# Patient Record
Sex: FEMALE | Race: OTHER | HISPANIC OR LATINO | URBAN - METROPOLITAN AREA
[De-identification: names, ages, dates, MRNs, and addresses within clinical notes are randomized per-mention and may not be internally consistent; named-entity substitution may affect disease eponyms.]

---

## 2021-09-04 ENCOUNTER — APPOINTMENT (OUTPATIENT)
Dept: RADIOLOGY | Facility: MEDICAL CENTER | Age: 61
End: 2021-09-04
Attending: EMERGENCY MEDICINE
Payer: MEDICARE

## 2021-09-04 ENCOUNTER — HOSPITAL ENCOUNTER (OUTPATIENT)
Facility: MEDICAL CENTER | Age: 61
End: 2021-09-04
Attending: EMERGENCY MEDICINE | Admitting: INTERNAL MEDICINE
Payer: MEDICARE

## 2021-09-04 VITALS
SYSTOLIC BLOOD PRESSURE: 179 MMHG | OXYGEN SATURATION: 94 % | HEIGHT: 63 IN | TEMPERATURE: 98.7 F | RESPIRATION RATE: 18 BRPM | WEIGHT: 165 LBS | HEART RATE: 71 BPM | DIASTOLIC BLOOD PRESSURE: 84 MMHG | BODY MASS INDEX: 29.23 KG/M2

## 2021-09-04 DIAGNOSIS — S01.81XA FACIAL LACERATION, INITIAL ENCOUNTER: ICD-10-CM

## 2021-09-04 DIAGNOSIS — S02.401B OPEN FRACTURE OF MAXILLARY SINUS, INITIAL ENCOUNTER (HCC): ICD-10-CM

## 2021-09-04 DIAGNOSIS — R55 SYNCOPE, UNSPECIFIED SYNCOPE TYPE: ICD-10-CM

## 2021-09-04 LAB
ALBUMIN SERPL BCP-MCNC: 3.3 G/DL (ref 3.2–4.9)
ALBUMIN/GLOB SERPL: 1.2 G/DL
ALP SERPL-CCNC: 108 U/L (ref 30–99)
ALT SERPL-CCNC: 31 U/L (ref 2–50)
ANION GAP SERPL CALC-SCNC: 11 MMOL/L (ref 7–16)
APPEARANCE UR: CLEAR
AST SERPL-CCNC: 19 U/L (ref 12–45)
BACTERIA #/AREA URNS HPF: NEGATIVE /HPF
BASOPHILS # BLD AUTO: 0.6 % (ref 0–1.8)
BASOPHILS # BLD: 0.04 K/UL (ref 0–0.12)
BILIRUB SERPL-MCNC: 0.2 MG/DL (ref 0.1–1.5)
BILIRUB UR QL STRIP.AUTO: NEGATIVE
BUN SERPL-MCNC: 44 MG/DL (ref 8–22)
CALCIUM SERPL-MCNC: 7.9 MG/DL (ref 8.5–10.5)
CHLORIDE SERPL-SCNC: 105 MMOL/L (ref 96–112)
CO2 SERPL-SCNC: 20 MMOL/L (ref 20–33)
COLOR UR: YELLOW
CREAT SERPL-MCNC: 1.34 MG/DL (ref 0.5–1.4)
EOSINOPHIL # BLD AUTO: 0.05 K/UL (ref 0–0.51)
EOSINOPHIL NFR BLD: 0.7 % (ref 0–6.9)
EPI CELLS #/AREA URNS HPF: NEGATIVE /HPF
ERYTHROCYTE [DISTWIDTH] IN BLOOD BY AUTOMATED COUNT: 47.9 FL (ref 35.9–50)
GLOBULIN SER CALC-MCNC: 2.7 G/DL (ref 1.9–3.5)
GLUCOSE SERPL-MCNC: 286 MG/DL (ref 65–99)
GLUCOSE UR STRIP.AUTO-MCNC: 500 MG/DL
HCT VFR BLD AUTO: 29.9 % (ref 37–47)
HGB BLD-MCNC: 9.3 G/DL (ref 12–16)
HYALINE CASTS #/AREA URNS LPF: ABNORMAL /LPF
IMM GRANULOCYTES # BLD AUTO: 0.03 K/UL (ref 0–0.11)
IMM GRANULOCYTES NFR BLD AUTO: 0.4 % (ref 0–0.9)
KETONES UR STRIP.AUTO-MCNC: NEGATIVE MG/DL
LEUKOCYTE ESTERASE UR QL STRIP.AUTO: ABNORMAL
LYMPHOCYTES # BLD AUTO: 2.02 K/UL (ref 1–4.8)
LYMPHOCYTES NFR BLD: 28.4 % (ref 22–41)
MCH RBC QN AUTO: 28.4 PG (ref 27–33)
MCHC RBC AUTO-ENTMCNC: 31.1 G/DL (ref 33.6–35)
MCV RBC AUTO: 91.4 FL (ref 81.4–97.8)
MICRO URNS: ABNORMAL
MONOCYTES # BLD AUTO: 0.38 K/UL (ref 0–0.85)
MONOCYTES NFR BLD AUTO: 5.3 % (ref 0–13.4)
NEUTROPHILS # BLD AUTO: 4.59 K/UL (ref 2–7.15)
NEUTROPHILS NFR BLD: 64.6 % (ref 44–72)
NITRITE UR QL STRIP.AUTO: NEGATIVE
NRBC # BLD AUTO: 0 K/UL
NRBC BLD-RTO: 0 /100 WBC
PH UR STRIP.AUTO: 6 [PH] (ref 5–8)
PLATELET # BLD AUTO: 305 K/UL (ref 164–446)
PMV BLD AUTO: 9.5 FL (ref 9–12.9)
POTASSIUM SERPL-SCNC: 3.5 MMOL/L (ref 3.6–5.5)
PROT SERPL-MCNC: 6 G/DL (ref 6–8.2)
PROT UR QL STRIP: NEGATIVE MG/DL
RBC # BLD AUTO: 3.27 M/UL (ref 4.2–5.4)
RBC # URNS HPF: ABNORMAL /HPF
RBC UR QL AUTO: NEGATIVE
SODIUM SERPL-SCNC: 136 MMOL/L (ref 135–145)
SP GR UR STRIP.AUTO: 1.01
TROPONIN T SERPL-MCNC: 18 NG/L (ref 6–19)
UROBILINOGEN UR STRIP.AUTO-MCNC: 0.2 MG/DL
WBC # BLD AUTO: 7.1 K/UL (ref 4.8–10.8)
WBC #/AREA URNS HPF: ABNORMAL /HPF

## 2021-09-04 PROCEDURE — 90715 TDAP VACCINE 7 YRS/> IM: CPT | Performed by: EMERGENCY MEDICINE

## 2021-09-04 PROCEDURE — 84484 ASSAY OF TROPONIN QUANT: CPT

## 2021-09-04 PROCEDURE — 304217 HCHG IRRIGATION SYSTEM

## 2021-09-04 PROCEDURE — 85025 COMPLETE CBC W/AUTO DIFF WBC: CPT

## 2021-09-04 PROCEDURE — 700101 HCHG RX REV CODE 250: Performed by: EMERGENCY MEDICINE

## 2021-09-04 PROCEDURE — 73140 X-RAY EXAM OF FINGER(S): CPT | Mod: LT

## 2021-09-04 PROCEDURE — 93005 ELECTROCARDIOGRAM TRACING: CPT | Performed by: EMERGENCY MEDICINE

## 2021-09-04 PROCEDURE — 99285 EMERGENCY DEPT VISIT HI MDM: CPT

## 2021-09-04 PROCEDURE — 72125 CT NECK SPINE W/O DYE: CPT | Mod: ME

## 2021-09-04 PROCEDURE — 700102 HCHG RX REV CODE 250 W/ 637 OVERRIDE(OP): Performed by: EMERGENCY MEDICINE

## 2021-09-04 PROCEDURE — 304999 HCHG REPAIR-SIMPLE/INTERMED LEVEL 1

## 2021-09-04 PROCEDURE — A9270 NON-COVERED ITEM OR SERVICE: HCPCS | Performed by: EMERGENCY MEDICINE

## 2021-09-04 PROCEDURE — 700111 HCHG RX REV CODE 636 W/ 250 OVERRIDE (IP): Performed by: EMERGENCY MEDICINE

## 2021-09-04 PROCEDURE — 71045 X-RAY EXAM CHEST 1 VIEW: CPT

## 2021-09-04 PROCEDURE — 303747 HCHG EXTRA SUTURE

## 2021-09-04 PROCEDURE — 70450 CT HEAD/BRAIN W/O DYE: CPT | Mod: ME

## 2021-09-04 PROCEDURE — 99284 EMERGENCY DEPT VISIT MOD MDM: CPT | Performed by: INTERNAL MEDICINE

## 2021-09-04 PROCEDURE — 90471 IMMUNIZATION ADMIN: CPT

## 2021-09-04 PROCEDURE — 80053 COMPREHEN METABOLIC PANEL: CPT

## 2021-09-04 PROCEDURE — 70486 CT MAXILLOFACIAL W/O DYE: CPT | Mod: MG

## 2021-09-04 PROCEDURE — 700105 HCHG RX REV CODE 258: Performed by: EMERGENCY MEDICINE

## 2021-09-04 PROCEDURE — 73140 X-RAY EXAM OF FINGER(S): CPT | Mod: RT

## 2021-09-04 PROCEDURE — 81001 URINALYSIS AUTO W/SCOPE: CPT

## 2021-09-04 PROCEDURE — G0378 HOSPITAL OBSERVATION PER HR: HCPCS

## 2021-09-04 RX ORDER — SODIUM CHLORIDE 9 MG/ML
1000 INJECTION, SOLUTION INTRAVENOUS ONCE
Status: COMPLETED | OUTPATIENT
Start: 2021-09-04 | End: 2021-09-04

## 2021-09-04 RX ORDER — IBUPROFEN 600 MG/1
600 TABLET ORAL ONCE
Status: COMPLETED | OUTPATIENT
Start: 2021-09-04 | End: 2021-09-04

## 2021-09-04 RX ORDER — METFORMIN HYDROCHLORIDE 500 MG/1
1000 TABLET, EXTENDED RELEASE ORAL 2 TIMES DAILY
COMMUNITY

## 2021-09-04 RX ORDER — AMOXICILLIN AND CLAVULANATE POTASSIUM 875; 125 MG/1; MG/1
1 TABLET, FILM COATED ORAL ONCE
Status: COMPLETED | OUTPATIENT
Start: 2021-09-04 | End: 2021-09-04

## 2021-09-04 RX ORDER — ACARBOSE 50 MG/1
50 TABLET ORAL
COMMUNITY

## 2021-09-04 RX ORDER — LIDOCAINE HYDROCHLORIDE 20 MG/ML
20 INJECTION, SOLUTION INFILTRATION; PERINEURAL ONCE
Status: COMPLETED | OUTPATIENT
Start: 2021-09-04 | End: 2021-09-04

## 2021-09-04 RX ADMIN — CLOSTRIDIUM TETANI TOXOID ANTIGEN (FORMALDEHYDE INACTIVATED), CORYNEBACTERIUM DIPHTHERIAE TOXOID ANTIGEN (FORMALDEHYDE INACTIVATED), BORDETELLA PERTUSSIS TOXOID ANTIGEN (GLUTARALDEHYDE INACTIVATED), BORDETELLA PERTUSSIS FILAMENTOUS HEMAGGLUTININ ANTIGEN (FORMALDEHYDE INACTIVATED), BORDETELLA PERTUSSIS PERTACTIN ANTIGEN, AND BORDETELLA PERTUSSIS FIMBRIAE 2/3 ANTIGEN 0.5 ML: 5; 2; 2.5; 5; 3; 5 INJECTION, SUSPENSION INTRAMUSCULAR at 14:13

## 2021-09-04 RX ADMIN — IBUPROFEN 600 MG: 600 TABLET ORAL at 17:05

## 2021-09-04 RX ADMIN — AMOXICILLIN AND CLAVULANATE POTASSIUM 1 TABLET: 875; 125 TABLET, FILM COATED ORAL at 16:13

## 2021-09-04 RX ADMIN — SODIUM CHLORIDE 1000 ML: 9 INJECTION, SOLUTION INTRAVENOUS at 15:44

## 2021-09-04 RX ADMIN — LIDOCAINE HYDROCHLORIDE 20 ML: 20 INJECTION, SOLUTION INFILTRATION; PERINEURAL at 14:15

## 2021-09-04 ASSESSMENT — ENCOUNTER SYMPTOMS
FEVER: 0
CHILLS: 0
VOMITING: 0
CONSTIPATION: 0
ABDOMINAL PAIN: 0
BACK PAIN: 0
HEADACHES: 0
NAUSEA: 0
SHORTNESS OF BREATH: 0
PALPITATIONS: 0
DIZZINESS: 0
COUGH: 0
DIARRHEA: 0

## 2021-09-04 NOTE — ED PROVIDER NOTES
ED Provider Note    Scribed for Fredrick Boyd M.D. by Abbie Jennings. 9/4/2021, 1:53 PM.    Primary care provider: No primary care provider noted.   Means of arrival: EMS  History obtained from: Patient  History limited by: None    CHIEF COMPLAINT  Chief Complaint   Patient presents with   • Fall   • Syncope   • Lip Laceration   • Digit Pain     bilateral index finger   • Neck Pain   • Jaw Pain     PPE Note: I personally donned full PPE for all patient encounters during this visit, including wearing an N95 respirator mask and eye protection. Scribe remained outside the patient's room and did not have any contact with the patient for the duration of patient encounter.      EMI Shirley is a 61 y.o. female who presents to the Emergency Department with neck pain after sustaining a ground level fall prior to arrival. She remembers feeling dizzy but does not remember the fall. She believes she hit her head during the fall because she is experiencing neck pain as well as jaw pain. She is additionally complaining of index finger pain bilaterally. No alleviating or exacerbating factors identified. She denies taking any blood thinners. She denies chest pain or shortness of breath before or after the fall. She has a history of diabetes but denies any other medical problems. Her tetanus vaccine is not up to date.     REVIEW OF SYSTEMS  Pertinent positives include neck pain, dizziness, fall, jaw pain, index finger pain. Pertinent negatives include no shortness of breath and chest pain.  All other systems reviewed and negative.    PAST MEDICAL HISTORY   has a past medical history of Diabetes (HCC).    SURGICAL HISTORY  patient denies any surgical history    SOCIAL HISTORY  Social History     Tobacco Use   • Smoking status: None noted   Substance Use Topics   • Alcohol use: None noted   • Drug use: None noted       FAMILY HISTORY  No family history noted.    CURRENT MEDICATIONS  No current outpatient  "medications    ALLERGIES  No Known Allergies    PHYSICAL EXAM  VITAL SIGNS: /78   Pulse 80   Temp 37.1 °C (98.7 °F) (Temporal)   Resp 20   Ht 1.6 m (5' 3\")   Wt 74.8 kg (165 lb)   SpO2 92%   BMI 29.23 kg/m²     Constitutional: Well developed, Well nourished, No acute distress, Non-toxic appearance. Patient in C-collar.  HENT: Through and through 2 cm laceration to the left side of her mentum. Chipped lateral incisor, TMs normal, mucous membranes moist, midface stable  Eyes: nonicteric  Neck: vague neck tenderness but no tenderness to palpation over the spinal processes  Lymphatic: No lymphadenopathy noted.   Cardiovascular: Regular rate and rhythm, no gallops rubs or murmurs  Lungs: Clear bilaterally   Abdomen: NTTP, pelvis stable   Skin: Warm, Dry, no rash  Back: No TLS midline TTP  Rectal: Hemoccult negative.  Extremities: full ROM without TTP  Neurologic: Alert, appropriate, follows commands, moving all extremities, normal speech   Psychiatric: Affect normal    DIAGNOSTIC STUDIES / PROCEDURES    LABS  Results for orders placed or performed during the hospital encounter of 09/04/21   CBC with Differential   Result Value Ref Range    WBC 7.1 4.8 - 10.8 K/uL    RBC 3.27 (L) 4.20 - 5.40 M/uL    Hemoglobin 9.3 (L) 12.0 - 16.0 g/dL    Hematocrit 29.9 (L) 37.0 - 47.0 %    MCV 91.4 81.4 - 97.8 fL    MCH 28.4 27.0 - 33.0 pg    MCHC 31.1 (L) 33.6 - 35.0 g/dL    RDW 47.9 35.9 - 50.0 fL    Platelet Count 305 164 - 446 K/uL    MPV 9.5 9.0 - 12.9 fL    Neutrophils-Polys 64.60 44.00 - 72.00 %    Lymphocytes 28.40 22.00 - 41.00 %    Monocytes 5.30 0.00 - 13.40 %    Eosinophils 0.70 0.00 - 6.90 %    Basophils 0.60 0.00 - 1.80 %    Immature Granulocytes 0.40 0.00 - 0.90 %    Nucleated RBC 0.00 /100 WBC    Neutrophils (Absolute) 4.59 2.00 - 7.15 K/uL    Lymphs (Absolute) 2.02 1.00 - 4.80 K/uL    Monos (Absolute) 0.38 0.00 - 0.85 K/uL    Eos (Absolute) 0.05 0.00 - 0.51 K/uL    Baso (Absolute) 0.04 0.00 - 0.12 K/uL    " Immature Granulocytes (abs) 0.03 0.00 - 0.11 K/uL    NRBC (Absolute) 0.00 K/uL   Complete Metabolic Panel (CMP)   Result Value Ref Range    Sodium 136 135 - 145 mmol/L    Potassium 3.5 (L) 3.6 - 5.5 mmol/L    Chloride 105 96 - 112 mmol/L    Co2 20 20 - 33 mmol/L    Anion Gap 11.0 7.0 - 16.0    Glucose 286 (H) 65 - 99 mg/dL    Bun 44 (H) 8 - 22 mg/dL    Creatinine 1.34 0.50 - 1.40 mg/dL    Calcium 7.9 (L) 8.5 - 10.5 mg/dL    AST(SGOT) 19 12 - 45 U/L    ALT(SGPT) 31 2 - 50 U/L    Alkaline Phosphatase 108 (H) 30 - 99 U/L    Total Bilirubin 0.2 0.1 - 1.5 mg/dL    Albumin 3.3 3.2 - 4.9 g/dL    Total Protein 6.0 6.0 - 8.2 g/dL    Globulin 2.7 1.9 - 3.5 g/dL    A-G Ratio 1.2 g/dL   Troponin   Result Value Ref Range    Troponin T 18 6 - 19 ng/L   URINALYSIS (UA)    Specimen: Urine   Result Value Ref Range    Micro Urine Req Microscopic    ESTIMATED GFR   Result Value Ref Range    GFR If  49 (A) >60 mL/min/1.73 m 2    GFR If Non African American 40 (A) >60 mL/min/1.73 m 2       All labs reviewed by me.    EKG  Obtained at 1:23 PM   Normal Sinus rhythm   Rate 78  Axis normal   Intervals normal   No ST segment elevation or depression   No T wave inversion  No Ectopy    RADIOLOGY  DX-FINGER(S) 2+ LEFT   Final Result      No evidence of fracture or dislocation.      DX-FINGER(S) 2+ RIGHT   Final Result      No evidence of fracture or dislocation.      CT-HEAD W/O   Final Result         1. No acute intracranial abnormality. No evidence of acute intracranial hemorrhage or mass lesion.               CT-CSPINE WITHOUT PLUS RECONS   Final Result      No acute fracture or dislocation seen in the CT scan of the cervical spine.      CT-MAXILLOFACIAL W/O PLUS RECONS   Final Result         1.  Minimally displaced fracture of the lateral wall of the left maxillary sinus.      2.  Small amount of fluid noted in the left maxillary sinus.      3.  No other facial fractures are identified.      DX-CHEST-PORTABLE (1 VIEW)    Final Result         No acute cardiac or pulmonary abnormality is identified.        The radiologist's interpretation of all radiological studies have been reviewed by me.    COURSE & MEDICAL DECISION MAKING  Nursing notes, VS, PMSFHx reviewed in chart.     1:53 PM - Patient seen and examined at bedside. Ordered for DX- Fingers Right and Left, CT-Maxillofacial, CT-CSpine, CT-Head, DX-Chest, Urinalysis, CB with diff, CMP, Troponin, Estimated GFR, and EKG to evaluate. Patient was treated with Adacel 0.5 ml injection and Xylocaine 2% prior to procedure for her symptoms.     2:15 PM - Patient's hematocrit was 29.9. She is not low risk based on [place] Syncope Rules.     2:38 PM - Treated patient with NS infusion 1000 ml.    2:39 PM - Performed rectal exam as noted in physical exam above.    3:24 PM - Patient was reevaluated at bedside. Discussed radiology results with the patient and informed them about the plan for admission after the laceration repair because of her syncope risk level. Patient verbalizes understanding and agreement to this plan of care. Performed laceration repair as noted below at this time.     Laceration Repair Procedure Note    Indication: Laceration    Procedure: The patient was placed in the appropriate position and anesthesia around the laceration was obtained by infiltration using 4.0 cc of 2% Lidocaine without epinephrine. The area was then cleansed with betadine and draped in a sterile fashion. The wound was explored and no foreign body was found. The laceration was closed in three layers. The deep layer was closed with 5-0 Vicryl using 3 interrupted sutures .  The subcutaneous layer was closed with 5-0 Vicryl using 1 interrupted suture and the skin was closed with 5-0 Ethilon using 3 interrupted sutures. There were no additional lacerations requiring repair. The wound area was then dressed with a sterile dressing.      Total repaired wound length: 2 cm.     Other Items: Suture  count: 7    The patient tolerated the procedure well.    Complications: None    3:44 PM - Paged Hospitalist.     Decision Making:  This is a 61 y.o. year old female who presents with a syncopal episode.  The patient also appears to be anemic-she is Hemoccult negative from below on rectal exam.  EKG here demonstrates no definite ischemic findings.  Based on her anemia, she is not low risk according to [place] syncope rules.  The patient will be admitted on telemetry.  At this point she has had no arrhythmias that I have seen.  I did repair her laceration.  She does appear to have a maxillary sinus fracture that would likely not need any acute care.  She will be covered with antibiotics for her laceration.    5 PM-hospitalist discussed the patient's care with both the  and patient. She has decided to sign out AGAINST MEDICAL ADVICE. She understands that she will need a relatively urgent outpatient echocardiogram as well as cardiology follow-up. She lives out of town. She understands by signing out AGAINST MEDICAL ADVICE that she risks the possibility of morbidity and/or death related to cardiac arrhythmia and/or other process as the source of syncope. She is welcome return at any time for full evaluation.    DISPOSITION:  Signing out AGAINST MEDICAL ADVICE      FINAL IMPRESSION  1. Facial laceration, initial encounter    2. Syncope, unspecified syncope type    3. Open fracture of maxillary sinus, initial encounter ([facility])          Abbie JOHNSON (Scribe), am scribing for, and in the presence of, Fredrick Boyd M.D..    Electronically signed by: Abbie Jennings (Rajan), 9/4/2021    Fredrick JOHNSON M.D. personally performed the services described in this documentation, as scribed by Abbie Jennings in my presence, and it is both accurate and complete.    The note accurately reflects work and decisions made by me.  Fredrick Boyd M.D.  9/4/2021  3:51 PM

## 2021-09-04 NOTE — ED TRIAGE NOTES
Chief Complaint   Patient presents with   • Fall   • Syncope   • Lip Laceration   • Digit Pain     bilateral index finger   • Neck Pain   • Jaw Pain     Pt bib ems from rib cook off, pt became dizzy and had syncopal episode hitting face on metal box. Inner lip laceration bleeding controlled, pt arrived on c-collar . C/o bilateral index finger, removed all jewerly.   fsbs 500, pt's  said that she just ate funnel cake prior her passing out.

## 2021-09-04 NOTE — ED NOTES
Med rec updated and complete. Allergies reviewed. Pt denies  Antibiotic use in last 30 days.  Pt is from [facility] and does not have a local pharmacy.    Pt fills at [facility] [phone]             Current Outpatient Medications on File Prior to Encounter   Medication Sig Dispense Refill   • acarbose (PRECOSE) 50 MG Tab Take 50 mg by mouth 3 times a day with meals.     • SITagliptin (JANUVIA) 100 MG Tab Take 100 mg by mouth every day.     • metFORMIN ER (GLUCOPHAGE XR) 500 MG TABLET SR 24 HR Take 1,000 mg by mouth 2 times a day.     '

## 2021-09-05 NOTE — ED NOTES
Pt speaking with hospitalist stating that she want s to sign out AMA, pt reporting that she can she her primary. ERP made aware.

## 2021-09-05 NOTE — CONSULTS
Hospital Medicine Consultation    Date of Service  9/4/2021    Referring Physician  Dr. Boyd (EDP)    Consulting Physician  Amarjit Gomez M.D.    Reason for Consultation  syncope    History of Presenting Illness  61 y.o. female PMH diabetes who presented 9/4/2021 with syncope while at the rib fair.  Patient stated she had just eaten and started walking around with her .  She went into the store and started feeling dizzy.  She sat next to a fan and afterwards had passed out.  Patient injured her chin with laceration, on exam in the ED patient's x-ray showed right maxillary fracture.  Patient has not had an episode of fainting in the past.  Patient stated she was drinking plenty of water and staying hydrated.  She stated she has been increasing her water intake.  Patient denied any chest pain, abdominal pain, recent illness.  Patient [place].    Review of Systems  Review of Systems   Constitutional: Negative for chills, fever and malaise/fatigue.   Respiratory: Negative for cough and shortness of breath.    Cardiovascular: Negative for chest pain and palpitations.   Gastrointestinal: Negative for abdominal pain, constipation, diarrhea, nausea and vomiting.   Musculoskeletal: Negative for back pain and joint pain.   Neurological: Negative for dizziness and headaches.   All other systems reviewed and are negative.      Past Medical History   has a past medical history of Diabetes (HCC).    Surgical History   Patient denied surgical history    Family History  Patient denied family history of early death, syncope, heart attacks.    Social History   Patient denied ETOH use, illicit drug use, tobacco use. , lives in [place].    Medications  Prior to Admission Medications   Prescriptions Last Dose Informant Patient Reported? Taking?   SITagliptin (JANUVIA) 100 MG Tab 9/4/2021 at 0930 Patient Yes Yes   Sig: Take 100 mg by mouth every day.   acarbose (PRECOSE) 50 MG  Tab 9/4/2021 at 0900 Patient Yes Yes   Sig: Take 50 mg by mouth 3 times a day with meals.   metFORMIN ER (GLUCOPHAGE XR) 500 MG TABLET SR 24 HR 9/4/2021 at 0930 Patient Yes Yes   Sig: Take 1,000 mg by mouth 2 times a day.      Facility-Administered Medications: None       Allergies  No Known Allergies    Physical Exam  Temp:  [37.1 °C (98.7 °F)] 37.1 °C (98.7 °F)  Pulse:  [71-80] 71  Resp:  [18-20] 18  BP: (138-179)/(69-84) 179/84  SpO2:  [92 %-94 %] 94 %    Physical Exam  Vitals and nursing note reviewed.   Constitutional:       General: She is not in acute distress.     Appearance: Normal appearance. She is not ill-appearing.   HENT:      Head:      Comments: Contusion to right face, laceration repair with sutures on chin     Right Ear: External ear normal.      Left Ear: External ear normal.      Mouth/Throat:      Mouth: Mucous membranes are dry.      Pharynx: Oropharynx is clear.   Eyes:      General: No scleral icterus.     Extraocular Movements: Extraocular movements intact.   Cardiovascular:      Rate and Rhythm: Normal rate.      Pulses: Normal pulses.      Heart sounds: Normal heart sounds. No murmur heard.     Pulmonary:      Effort: Pulmonary effort is normal. No respiratory distress.      Breath sounds: Normal breath sounds.   Abdominal:      General: Abdomen is flat. Bowel sounds are normal. There is no distension.      Palpations: Abdomen is soft.   Musculoskeletal:         General: No swelling or tenderness. Normal range of motion.      Cervical back: Normal range of motion and neck supple.   Skin:     General: Skin is warm.      Capillary Refill: Capillary refill takes less than 2 seconds.      Coloration: Skin is not jaundiced.      Findings: No erythema.   Neurological:      General: No focal deficit present.      Mental Status: She is alert and oriented to person, place, and time. Mental status is at baseline.      Motor: No weakness.   Psychiatric:         Mood and Affect: Mood normal.          Behavior: Behavior normal.         Thought Content: Thought content normal.         Judgment: Judgment normal.         Fluids  Date 09/04/21 0700 - 09/05/21 0659(Discharged)   Shift 9592-1460 1089-0301 7795-3725 24 Hour Total   INTAKE   I.V.  200  200   Shift Total  200  200   OUTPUT   Shift Total       Weight (kg) 74.8 74.8 74.8 74.8       Laboratory  Recent Labs     09/04/21  1340   WBC 7.1   RBC 3.27*   HEMOGLOBIN 9.3*   HEMATOCRIT 29.9*   MCV 91.4   MCH 28.4   MCHC 31.1*   RDW 47.9   PLATELETCT 305   MPV 9.5     Recent Labs     09/04/21  1340   SODIUM 136   POTASSIUM 3.5*   CHLORIDE 105   CO2 20   GLUCOSE 286*   BUN 44*   CREATININE 1.34   CALCIUM 7.9*                     Imaging  DX-FINGER(S) 2+ LEFT   Final Result      No evidence of fracture or dislocation.      DX-FINGER(S) 2+ RIGHT   Final Result      No evidence of fracture or dislocation.      CT-HEAD W/O   Final Result         1. No acute intracranial abnormality. No evidence of acute intracranial hemorrhage or mass lesion.               CT-CSPINE WITHOUT PLUS RECONS   Final Result      No acute fracture or dislocation seen in the CT scan of the cervical spine.      CT-MAXILLOFACIAL W/O PLUS RECONS   Final Result         1.  Minimally displaced fracture of the lateral wall of the left maxillary sinus.      2.  Small amount of fluid noted in the left maxillary sinus.      3.  No other facial fractures are identified.      DX-CHEST-PORTABLE (1 VIEW)   Final Result         No acute cardiac or pulmonary abnormality is identified.          Assessment/Plan    Active Problems:    Type 2 diabetes mellitus with hyperglycemia, without long-term current use of insulin (HCC) (Chronic) POA: Yes    Anemia (Chronic) POA: Yes    Essential hypertension POA: Yes    Hypokalemia POA: Yes    Hypocalcemia POA: Yes  Resolved Problems:    Vasovagal syncope POA: Yes    Had spoken to the patient with her  present in the room.  I explained the concerns that the ED physician  had about the patient's syncope as this was her first event.  The patient did describe her events as feeling dizzy and then diaphoretic, which likely she had a vasovagal syncope event.  Patient did present with slightly elevated creatinine of 1.34, glucose level of 286.  Patient's last HbA1c here in 6/2020 was 7.7.      At this time patient did NOT want to be admitted to the hospital and requested to go home and follow-up with her primary care physician.       I did make the following recommendations to the patient:  1. she will need an echocardiogram outpatient  2.  referral to cardiology outpatient with her PCP as soon as possible  3.  to see her PCP as soon as possible within the next few days, recheck her labs given electrolyte abnormalities including potassium and calcium  4.  I did explain to the patient that she has elevated blood pressure here, has hypertension and will need to be reevaluated by her PCP.  5.  full evaluation for her anemia as she has a hemoglobin of 9.3 and she is post menopause.    6.  Recommend colonoscopy referral outpatient if she has iron deficiency anemia. 7.  given the patient's elevated glucose of 286, recommended that she will need to revisit her diabetic medications at this may be causing her to have increased polydipsia, likely uncontrolled at this time and will need a repeat A1c with her PCP.    8. Recommended for the patient not to drive home, her  was present and understood this direction and stated he will be driving the patient home.      I explained to her the risk and benefits about going home without further evaluation.  She accepted the risks at this time.  Patient understood these directions, I had spoken to with the ED physician Dr. Boyd, who indicated that the patient will have to leave AMA from the ER.  Patient understood these and decided that she will leave AMA, signed paperwork with the the emergency department.    Total time consultation 75 minutes, this  included more than 51% of the time spent with the patient, spent on interview, physical examination, recommendations to the patient directly, other time spent speaking with bedside nurse, ED physician and reviewing patient's history.

## 2021-09-05 NOTE — ED NOTES
Pt signed AMA form and is aware of dangers of leaving AMA. Pt lives in [facility] and states she can follow up with primary MD.

## 2021-09-05 NOTE — DISCHARGE INSTRUCTIONS
You are signing out AGAINST MEDICAL ADVICE. Recommend follow-up cardiology consultation within the next week.

## 2021-09-05 NOTE — ED NOTES
Pt given discharge instructions/ home care instructions explained, pt verbalized understanding of instructions given/pt understands the importance of follow up, pt ambulatory to ER haley.